# Patient Record
(demographics unavailable — no encounter records)

---

## 2025-01-21 NOTE — HISTORY OF PRESENT ILLNESS
[FreeTextEntry1] : 2025: Presents for follow-up, no new warts noted in anal area as per patient report, no new bleeding or pain.  2024: Presents for follow-up.  Denies any significant complaints/sensations of lesions in area.  Feels well.  2024: Presents for follow-up after 5/15/2024 EUA, HRA, biopsy and fulguration of HPV.  Feels well, no new complaints.  Pathology reviewed, as below   Collected Date/Time:                   5/15/2024 07:33 EDT Received Date/Time:                    5/15/2024 11:44 EDT  Surgical Pathology Report - Auth (Verified)  Specimen(s) Submitted 1  Left anterior anal canal biopsy 2  Left posterior dante anal skin condyloma 3  Left posterior anal canal biopsy 4  Right posterior dante anal skin condyloma 5  Right posterior anal canal condyloma 6  Right anterior anal canal biopsy  Final Diagnosis  1.  Rectoanal tissue (left anterior anal canal): -   HIGH-GRADE SQUAMOUS INTRAEPITHELIAL LESION WITH NUCLEAR F EATURES SUGGESTING HPV EFFECT. -   SQUAMOUS DYSPLASIA INVOLVES COLONIC CRYPTS.  2.  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  3.  Rectoanal tissue (left posterior anal canal): -   HIGH-GRADE SQUAMOUS INTRAEPITHELIAL LESION WITH NUCLEAR FEATURES SUGGESTING HPV EFFECT. -   SQUAMOUS DYSPLASIA INVOLVES COLONIC CRYPTS.  4.  Skin (right posterior perianal): -   SMALL FOCUS OF CONDYLOMA ACUMINATUM (LOW-GRADE LESION)  5.  Rectoanal tissue (right posterior anal canal): -   HIGH-GRADE SQUAMOUS INTRAEPITHELIAL LESION WITH NUCLEAR FEATURES SUGGESTING HPV EFFECT. -   SQUAMOUS DYSPLASIA INVOLVES COLONIC CRYPTS.  6.  Rectoanal tissue (right anterior anal canal): -   HIGH-GRADE SQUAMOUS INTRAEPITHELIAL LESION WITH NUCLEAR FEATURES SUGGESTING HPV EFFECT. -   SQUAMOUS DYSPLASIA INVOLVING COLONIC CRYPTS.  Verified by: ESTELLA MARK M.D. (Electronic Signature) Reported on: 24 13:51 EDT, [facility], [address] [place] [address] Phone: [phone]   Fax: [phone]  2024: 31yoM otherwise healthy who presents with complaints of perianal lumps.  No fever, chills, pain, bleeding.  He does take Truvada for PrEP but otherwise no medications.  Last had a colonoscopy in 2018, polyp excised - has not yet followed up.  Notes that his father  of colon cancer, was diagnosed in his mid-50s.

## 2025-01-21 NOTE — PLAN
[TextEntry] : -- Follow up anal Pap smear results - pending results will determine if repeat EUA needed -- Otherwise plan for follow-up surveillance visit in 6 months

## 2025-01-21 NOTE — PHYSICAL EXAM
[JVD] : no jugular venous distention  [Respiratory Effort] : normal respiratory effort [Normal Rate and Rhythm] : normal rate and rhythm [No Edema] : No edema [No Rash or Lesion] : No rash or lesion [Alert] : alert [Oriented to Person] : oriented to person [Oriented to Place] : oriented to place [Oriented to Time] : oriented to time [Calm] : calm [de-identified] : Soft, nondistended [de-identified] : External exam - perianal skin normal, no condyloma, fissure, fistula, excoriation, or hemorrhoids.  RENAE and anoscopy unremarkable/normal. [de-identified] : NAD [de-identified] : Normocephalic [de-identified] : Moves all extremities

## 2025-01-21 NOTE — PROCEDURE
[FreeTextEntry1] : After informed consent was obtained, the sample brush was carefully guided into the anal canal without lubrication and a Pap smear was performed.  The patient tolerated the procedure well.  After informed consent was obtained, a lubricated lighted anoscope was inserted into the anal canal to evaluate for condyloma. The canal was inspected circumferentially up to the level above the dentate line.  The scope was withdrawn. The patient tolerated the procedure well.